# Patient Record
Sex: FEMALE | Race: WHITE
[De-identification: names, ages, dates, MRNs, and addresses within clinical notes are randomized per-mention and may not be internally consistent; named-entity substitution may affect disease eponyms.]

---

## 2019-02-18 ENCOUNTER — HOSPITAL ENCOUNTER (EMERGENCY)
Dept: HOSPITAL 54 - ER | Age: 29
Discharge: HOME | End: 2019-02-18
Payer: COMMERCIAL

## 2019-02-18 VITALS — BODY MASS INDEX: 21.47 KG/M2 | WEIGHT: 150 LBS | HEIGHT: 70 IN

## 2019-02-18 VITALS — DIASTOLIC BLOOD PRESSURE: 77 MMHG | SYSTOLIC BLOOD PRESSURE: 148 MMHG
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
## 2019-02-18 DIAGNOSIS — N75.1: ICD-10-CM
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
## 2019-02-18 DIAGNOSIS — Z98.890: ICD-10-CM
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
## 2019-02-18 DIAGNOSIS — S20.212A: Primary | ICD-10-CM

## 2019-02-18 PROCEDURE — 99283 EMERGENCY DEPT VISIT LOW MDM: CPT

## 2019-02-18 PROCEDURE — 71100 X-RAY EXAM RIBS UNI 2 VIEWS: CPT

## 2019-02-18 PROCEDURE — 56420 I&D BARTHOLINS GLAND ABSCESS: CPT

## 2019-02-18 PROCEDURE — A6402 STERILE GAUZE <= 16 SQ IN: HCPCS

## 2019-02-20 ENCOUNTER — HOSPITAL ENCOUNTER (EMERGENCY)
Dept: HOSPITAL 54 - ER | Age: 29
Discharge: HOME | End: 2019-02-20
Payer: COMMERCIAL

## 2019-02-20 VITALS — BODY MASS INDEX: 20.49 KG/M2 | HEIGHT: 64 IN | WEIGHT: 120 LBS

## 2019-02-20 VITALS — SYSTOLIC BLOOD PRESSURE: 121 MMHG | DIASTOLIC BLOOD PRESSURE: 73 MMHG

## 2019-02-20 DIAGNOSIS — Z98.890: ICD-10-CM

## 2019-02-20 DIAGNOSIS — N75.1: Primary | ICD-10-CM

## 2019-02-20 PROCEDURE — A6402 STERILE GAUZE <= 16 SQ IN: HCPCS

## 2019-02-20 PROCEDURE — Z7502: HCPCS

## 2019-02-20 NOTE — NUR
CALLED [facility] [phone] NOT AT OFFICE. [phone] [place] [facility] [place]